# Patient Record
Sex: MALE | Race: WHITE | NOT HISPANIC OR LATINO | Employment: UNEMPLOYED | URBAN - METROPOLITAN AREA
[De-identification: names, ages, dates, MRNs, and addresses within clinical notes are randomized per-mention and may not be internally consistent; named-entity substitution may affect disease eponyms.]

---

## 2019-03-07 ENCOUNTER — HOSPITAL ENCOUNTER (OUTPATIENT)
Dept: RADIOLOGY | Facility: CLINIC | Age: 49
Discharge: HOME | End: 2019-03-07
Payer: MEDICAID

## 2019-03-07 DIAGNOSIS — R60.9 EDEMA: ICD-10-CM

## 2019-03-07 DIAGNOSIS — R60.9 EDEMA: Primary | ICD-10-CM

## 2019-03-07 PROCEDURE — 93970 EXTREMITY STUDY: CPT | Mod: 26,,, | Performed by: RADIOLOGY

## 2019-03-07 PROCEDURE — 71046 XR CHEST PA AND LATERAL: ICD-10-PCS | Mod: 26,,, | Performed by: RADIOLOGY

## 2019-03-07 PROCEDURE — 71046 X-RAY EXAM CHEST 2 VIEWS: CPT | Mod: TC,FY,PO

## 2019-03-07 PROCEDURE — 93970 US LOWER EXTREMITY VEINS BILATERAL: ICD-10-PCS | Mod: 26,,, | Performed by: RADIOLOGY

## 2019-03-07 PROCEDURE — 71046 X-RAY EXAM CHEST 2 VIEWS: CPT | Mod: 26,,, | Performed by: RADIOLOGY

## 2019-03-07 PROCEDURE — 93970 EXTREMITY STUDY: CPT | Mod: TC,PO

## 2023-02-09 ENCOUNTER — OFFICE VISIT (OUTPATIENT)
Dept: URGENT CARE | Facility: CLINIC | Age: 53
End: 2023-02-09
Payer: MEDICAID

## 2023-02-09 VITALS
TEMPERATURE: 98 F | RESPIRATION RATE: 20 BRPM | HEART RATE: 92 BPM | WEIGHT: 190 LBS | BODY MASS INDEX: 24.38 KG/M2 | DIASTOLIC BLOOD PRESSURE: 109 MMHG | HEIGHT: 74 IN | OXYGEN SATURATION: 97 % | SYSTOLIC BLOOD PRESSURE: 178 MMHG

## 2023-02-09 DIAGNOSIS — I87.2 VENOUS STASIS DERMATITIS OF BOTH LOWER EXTREMITIES: Primary | ICD-10-CM

## 2023-02-09 PROCEDURE — 1159F MED LIST DOCD IN RCRD: CPT | Mod: CPTII,S$GLB,,

## 2023-02-09 PROCEDURE — 99203 OFFICE O/P NEW LOW 30 MIN: CPT | Mod: S$GLB,,,

## 2023-02-09 PROCEDURE — 3008F PR BODY MASS INDEX (BMI) DOCUMENTED: ICD-10-PCS | Mod: CPTII,S$GLB,,

## 2023-02-09 PROCEDURE — 1159F PR MEDICATION LIST DOCUMENTED IN MEDICAL RECORD: ICD-10-PCS | Mod: CPTII,S$GLB,,

## 2023-02-09 PROCEDURE — 3080F DIAST BP >= 90 MM HG: CPT | Mod: CPTII,S$GLB,,

## 2023-02-09 PROCEDURE — 99203 PR OFFICE/OUTPT VISIT, NEW, LEVL III, 30-44 MIN: ICD-10-PCS | Mod: S$GLB,,,

## 2023-02-09 PROCEDURE — 1160F PR REVIEW ALL MEDS BY PRESCRIBER/CLIN PHARMACIST DOCUMENTED: ICD-10-PCS | Mod: CPTII,S$GLB,,

## 2023-02-09 PROCEDURE — 3077F SYST BP >= 140 MM HG: CPT | Mod: CPTII,S$GLB,,

## 2023-02-09 PROCEDURE — 3077F PR MOST RECENT SYSTOLIC BLOOD PRESSURE >= 140 MM HG: ICD-10-PCS | Mod: CPTII,S$GLB,,

## 2023-02-09 PROCEDURE — 1160F RVW MEDS BY RX/DR IN RCRD: CPT | Mod: CPTII,S$GLB,,

## 2023-02-09 PROCEDURE — 3008F BODY MASS INDEX DOCD: CPT | Mod: CPTII,S$GLB,,

## 2023-02-09 PROCEDURE — 3080F PR MOST RECENT DIASTOLIC BLOOD PRESSURE >= 90 MM HG: ICD-10-PCS | Mod: CPTII,S$GLB,,

## 2023-02-09 RX ORDER — TRIAMCINOLONE ACETONIDE 1 MG/G
CREAM TOPICAL 2 TIMES DAILY
Qty: 80 G | Refills: 1 | Status: SHIPPED | OUTPATIENT
Start: 2023-02-09

## 2023-02-09 NOTE — PROGRESS NOTES
"Subjective:       Patient ID: Pool Luo is a 52 y.o. male.    Vitals:  height is 6' 2" (1.88 m) and weight is 86.2 kg (190 lb). His temperature is 97.8 °F (36.6 °C). His blood pressure is 178/109 (abnormal) and his pulse is 92. His respiration is 20 and oxygen saturation is 97%.     Chief Complaint: Rash    Pt is a 51 y/o M who presents with an itchy rash to his BL legs x3-4 months. States he has hx of lower leg edema and been diagnosed with stasis dermatitis and eczema in the past. Had a similar occurrence in 2018 that improved with topical steroids. Has not followed up with his PCP in some time. Denies any fever, blisters, purulent drainage, CP, SoB, abdominal pain, n/v.    Rash  This is a recurrent problem. The current episode started more than 1 month ago. The problem has been gradually worsening since onset. The affected locations include the right upper leg, right lower leg, left lower leg and left upper leg. The rash is characterized by redness, swelling and burning. He was exposed to nothing. Pertinent negatives include no congestion, cough, diarrhea, fever, shortness of breath, sore throat or vomiting. Past treatments include topical steroids. The treatment provided no relief.     Constitution: Negative for chills and fever.   HENT:  Negative for ear pain, ear discharge, congestion and sore throat.    Neck: Negative for neck pain and neck stiffness.   Cardiovascular:  Positive for leg swelling. Negative for chest pain.   Respiratory:  Negative for cough and shortness of breath.    Gastrointestinal:  Negative for abdominal pain, nausea, vomiting and diarrhea.   Genitourinary:  Negative for dysuria.   Skin:  Positive for rash and erythema. Negative for wound and abscess.   Allergic/Immunologic: Positive for itching.   Neurological:  Negative for dizziness and headaches.     Objective:      Physical Exam   Constitutional: He is oriented to person, place, and time. He appears well-developed.   HENT: "   Head: Normocephalic and atraumatic. Head is without abrasion, without contusion and without laceration.   Ears:   Right Ear: External ear normal.   Left Ear: External ear normal.   Nose: Nose normal.   Mouth/Throat: Oropharynx is clear and moist and mucous membranes are normal. Mucous membranes are moist. Oropharynx is clear.   Eyes: Conjunctivae, EOM and lids are normal. Pupils are equal, round, and reactive to light.   Neck: Trachea normal and phonation normal. Neck supple.   Cardiovascular: Normal rate, regular rhythm and normal heart sounds.   Pulmonary/Chest: Effort normal and breath sounds normal. No stridor. No respiratory distress.   Musculoskeletal: Normal range of motion.         General: Swelling present. Normal range of motion.      Right lower le+ Edema present.      Left lower le+ Edema present.   Neurological: He is alert and oriented to person, place, and time.   Skin: Skin is warm, dry, intact and rash (Erythematous, scaly rash to BL LEs.). Capillary refill takes less than 2 seconds. erythema No abrasion, No burn, No bruising and No ecchymosis   Psychiatric: His speech is normal and behavior is normal. Judgment and thought content normal.   Nursing note and vitals reviewed.      Assessment:       1. Venous stasis dermatitis of both lower extremities          Plan:         Venous stasis dermatitis of both lower extremities  -     triamcinolone acetonide 0.1% (KENALOG) 0.1 % cream; Apply topically 2 (two) times daily.  Dispense: 80 g; Refill: 1         Will give triamcinolone for stasis dermatitis.  Advised patient to follow-up with his PCP.  Clinic return ED precautions given.  Patient verbalizes understanding and agrees with plan.          Patient Instructions   Rashes  If your condition worsens or fails to improve we recommend that you receive another evaluation at the ER immediately or contact your PCP to discuss your concerns or return here. You must understand that you've received an  urgent care treatment only and that you may be released before all your medical problems are known or treated. You the patient will arrange for follouwp care as instructed.   -  Avoid picking or manipulating the affected areas. Clean the areas twice a day with water and soap.  Apply the topical cream as prescribed.     -  You should seek ER treatment if fever, increase pain, swelling, red streaks from affected area or other signs of increasing infection.     -  Tylenol or ibuprofen can also be used as directed for pain unless you have an allergy to them or medical condition such as stomach ulcers, kidney or liver disease or blood thinners etc for which you should not be taking these type of medications.   If not allergic, please take over the counter Tylenol (Acetaminophen) and/or Motrin (Ibuprofen) as directed for control of pain and/or fever.

## 2023-09-27 ENCOUNTER — HOSPITAL ENCOUNTER (EMERGENCY)
Facility: HOSPITAL | Age: 53
Discharge: HOME | End: 2023-09-27
Attending: EMERGENCY MEDICINE
Payer: MEDICAID

## 2023-09-27 VITALS
HEART RATE: 94 BPM | HEIGHT: 74 IN | RESPIRATION RATE: 20 BRPM | BODY MASS INDEX: 24.38 KG/M2 | DIASTOLIC BLOOD PRESSURE: 94 MMHG | TEMPERATURE: 98 F | WEIGHT: 190 LBS | SYSTOLIC BLOOD PRESSURE: 174 MMHG | OXYGEN SATURATION: 97 %

## 2023-09-27 DIAGNOSIS — L03.115 CELLULITIS OF RIGHT LOWER EXTREMITY: Primary | ICD-10-CM

## 2023-09-27 LAB
ALBUMIN SERPL-MCNC: 3.9 G/DL (ref 3.3–5.5)
ALLENS TEST: ABNORMAL
ALP SERPL-CCNC: 76 U/L (ref 42–141)
BILIRUB SERPL-MCNC: 0.5 MG/DL (ref 0.2–1.6)
BUN SERPL-MCNC: 8 MG/DL (ref 7–22)
CALCIUM SERPL-MCNC: 9.8 MG/DL (ref 8–10.3)
CHLORIDE SERPL-SCNC: 101 MMOL/L (ref 98–108)
CREAT SERPL-MCNC: 0.9 MG/DL (ref 0.6–1.2)
GLUCOSE SERPL-MCNC: 80 MG/DL (ref 73–118)
HCO3 UR-SCNC: 32 MMOL/L
LDH SERPL L TO P-CCNC: 1.26 MMOL/L (ref 0.5–2.2)
PCO2 BLDA: 57.2 MMHG (ref 35–45)
PH SMN: 7.36 [PH] (ref 7.35–7.45)
PO2 BLDA: 16 MMHG (ref 40–60)
POC ALT (SGPT): 20 U/L (ref 10–47)
POC AST (SGOT): 23 U/L (ref 11–38)
POC BE: 5 MMOL/L
POC SATURATED O2: 19 % (ref 95–100)
POC TCO2: 28 MMOL/L (ref 18–33)
POC TCO2: 34 MMOL/L (ref 24–29)
POTASSIUM BLD-SCNC: 3.8 MMOL/L (ref 3.6–5.1)
PROTEIN, POC: 7.5 G/DL (ref 6.4–8.1)
SAMPLE: ABNORMAL
SITE: ABNORMAL
SODIUM BLD-SCNC: 138 MMOL/L (ref 128–145)

## 2023-09-27 PROCEDURE — 83605 ASSAY OF LACTIC ACID: CPT | Mod: ER

## 2023-09-27 PROCEDURE — 96361 HYDRATE IV INFUSION ADD-ON: CPT | Mod: ER

## 2023-09-27 PROCEDURE — 96374 THER/PROPH/DIAG INJ IV PUSH: CPT | Mod: ER,59

## 2023-09-27 PROCEDURE — 99285 EMERGENCY DEPT VISIT HI MDM: CPT | Mod: 25,ER

## 2023-09-27 PROCEDURE — 87040 BLOOD CULTURE FOR BACTERIA: CPT | Mod: 59

## 2023-09-27 PROCEDURE — 25000003 PHARM REV CODE 250: Mod: ER

## 2023-09-27 PROCEDURE — 63600175 PHARM REV CODE 636 W HCPCS: Mod: ER

## 2023-09-27 PROCEDURE — 80053 COMPREHEN METABOLIC PANEL: CPT | Mod: ER

## 2023-09-27 PROCEDURE — 82803 BLOOD GASES ANY COMBINATION: CPT | Mod: ER

## 2023-09-27 RX ORDER — IBUPROFEN 800 MG/1
800 TABLET ORAL EVERY 6 HOURS PRN
Qty: 20 TABLET | Refills: 0 | Status: SHIPPED | OUTPATIENT
Start: 2023-09-27

## 2023-09-27 RX ORDER — CLINDAMYCIN HYDROCHLORIDE 150 MG/1
450 CAPSULE ORAL
Status: COMPLETED | OUTPATIENT
Start: 2023-09-27 | End: 2023-09-27

## 2023-09-27 RX ORDER — MUPIROCIN 20 MG/G
OINTMENT TOPICAL 3 TIMES DAILY
Qty: 22 G | Refills: 0 | Status: SHIPPED | OUTPATIENT
Start: 2023-09-27

## 2023-09-27 RX ORDER — KETOROLAC TROMETHAMINE 30 MG/ML
15 INJECTION, SOLUTION INTRAMUSCULAR; INTRAVENOUS
Status: COMPLETED | OUTPATIENT
Start: 2023-09-27 | End: 2023-09-27

## 2023-09-27 RX ORDER — CLINDAMYCIN HYDROCHLORIDE 150 MG/1
450 CAPSULE ORAL 3 TIMES DAILY
Qty: 63 CAPSULE | Refills: 0 | Status: SHIPPED | OUTPATIENT
Start: 2023-09-27 | End: 2023-10-04

## 2023-09-27 RX ORDER — ACETAMINOPHEN 500 MG
500 TABLET ORAL EVERY 6 HOURS PRN
Qty: 28 TABLET | Refills: 0 | Status: SHIPPED | OUTPATIENT
Start: 2023-09-27 | End: 2023-10-04

## 2023-09-27 RX ADMIN — CLINDAMYCIN HYDROCHLORIDE 450 MG: 150 CAPSULE ORAL at 05:09

## 2023-09-27 RX ADMIN — KETOROLAC TROMETHAMINE 15 MG: 30 INJECTION, SOLUTION INTRAMUSCULAR; INTRAVENOUS at 05:09

## 2023-09-27 RX ADMIN — SODIUM CHLORIDE 1000 ML: 9 INJECTION, SOLUTION INTRAVENOUS at 05:09

## 2023-09-27 NOTE — DISCHARGE INSTRUCTIONS

## 2023-09-28 NOTE — ED PROVIDER NOTES
"Encounter Date: 9/27/2023       History     Chief Complaint   Patient presents with    Insect Bite     Pt reports "unknown" insect bite since Thursday to the right knee. Open wound noted. Redness and swelling noted. Drainage noted. Pt took otc medications with no relief. Denies trauma/injury.     Pool Luo is a 53-year-old male with no pertinent past medical history who presents to the emergency department with a chief complaint of insect bite.  He was not sure that he was ever bitten by an insect.  He has a draining lesion on his right knee.  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  Since then, it has been growing and becoming more painful.  Yesterday, began draining.  States that it is painful to bend his knee.  Denies any fever.  Endorses associated surrounding redness that is traveling up his leg.    The history is provided by the patient. No  was used.     Review of patient's allergies indicates:  No Known Allergies  No past medical history on file.  No past surgical history on file.  No family history on file.  Social History     Tobacco Use    Smoking status: Every Day     Types: Cigarettes    Smokeless tobacco: Never   Substance Use Topics    Alcohol use: Not Currently    Drug use: Yes     Types: Marijuana     Review of Systems   Constitutional:  Negative for fever.   HENT:  Negative for sore throat.    Respiratory:  Negative for shortness of breath.    Cardiovascular:  Negative for chest pain.   Gastrointestinal:  Negative for nausea.   Genitourinary:  Negative for dysuria.   Musculoskeletal:  Negative for back pain.   Skin:  Positive for color change and wound. Negative for rash.   Neurological:  Negative for weakness.   Hematological:  Does not bruise/bleed easily.       Physical Exam     Initial Vitals [09/27/23 1458]   BP Pulse Resp Temp SpO2   (!) 166/90 105 18 98.7 °F (37.1 °C) 98 %    "   MAP       --         Physical Exam    Nursing note and vitals reviewed.  Constitutional: Vital signs are normal. He appears well-developed and well-nourished. He is active and cooperative. He does not appear ill. No distress.   Clinically well-appearing, no acute distress.  He was hypertensive.   HENT:   Head: Normocephalic and atraumatic.   Right Ear: Hearing and external ear normal.   Left Ear: Hearing and external ear normal.   Nose: Nose normal.   Mouth/Throat: Oropharynx is clear and moist and mucous membranes are normal. No posterior oropharyngeal edema or posterior oropharyngeal erythema.   Eyes: Conjunctivae and EOM are normal.   Neck: Phonation normal. Neck supple. No stridor present.   Normal range of motion.   Full passive range of motion without pain.     Cardiovascular:  Normal rate, regular rhythm, S1 normal, S2 normal and normal heart sounds.  No extrasystoles are present.          No murmur heard.  No tachycardia.  No murmurs or rubs.   Pulmonary/Chest: Effort normal and breath sounds normal. No accessory muscle usage. No tachypnea. No respiratory distress. He exhibits no tenderness.   Abdominal: Abdomen is soft and flat. Bowel sounds are normal. He exhibits no distension. There is no abdominal tenderness.   Musculoskeletal:      Cervical back: Full passive range of motion without pain, normal range of motion and neck supple. No spinous process tenderness. Normal range of motion.      Right knee: Swelling and erythema present. Normal range of motion. Tenderness present.        Legs:       Comments: Approximately 3 cm area of erythema tenderness to palpation.  Warm to the touch.  Central punctum with purulent drainage.  Erythema spreading up the leg.  Patient has full range of motion of the right knee with encouragement.     Neurological: He is alert and oriented to person, place, and time. GCS eye subscore is 4. GCS verbal subscore is 5. GCS motor subscore is 6.   Skin: Skin is warm and dry.  Capillary refill takes less than 2 seconds. No rash noted. No pallor.         ED Course   Procedures  Labs Reviewed   ISTAT PROCEDURE - Abnormal; Notable for the following components:       Result Value    POC PCO2 57.2 (*)     POC PO2 16 (*)     POC SATURATED O2 19 (*)     POC TCO2 34 (*)     All other components within normal limits   CULTURE, AEROBIC  (SPECIFY SOURCE)   CULTURE, BLOOD   CULTURE, BLOOD   POCT CBC   POCT CMP   POCT CMP          Imaging Results              US Soft Tissue Fluid Collection with Imaging (Final result)  Result time 09/27/23 18:33:18      Final result by Valencia Wilson MD (09/27/23 18:33:18)                   Impression:      As above described.      Electronically signed by: Valencia Wilson  Date:    09/27/2023  Time:    18:33               Narrative:    EXAMINATION:  ULTRASOUND SOFT TISSUE FLUID COLLECTION PERCUTANEOUS WITH IMAGING    CLINICAL HISTORY:  Right knee cellulitis / abscess;    TECHNIQUE:  Real-time ultrasound the right knee was performed.    COMPARISON:  None.    FINDINGS:  No evidence of focal fluid collection in the area of the right knee.  Edematous changes are present.  Question cellulitis.                                       Medications   sodium chloride 0.9% bolus 1,000 mL 1,000 mL (0 mLs Intravenous Stopped 9/27/23 1836)   ketorolac injection 15 mg (15 mg Intravenous Given 9/27/23 1737)   clindamycin capsule 450 mg (450 mg Oral Given 9/27/23 1737)     Medical Decision Making  53-year-old male presenting to the emergency department with a chief complaint of wound on his knee.  Unsure exactly how wound started.  Has been growing, started draining yesterday, surrounding erythema and warmth.  On physical exam, patient is clinically well-appearing and in no acute distress.  Afebrile, no tachycardia.  Wound to the right knee with purulent drainage and surrounding erythema that is spreading up the leg.    Differential diagnosis includes but is not limited to wound  with or without muscle, tendon, ligament, bone, or neurovascular damage, foreign body, or surrounding soft tissue infection such as cellulitis or erysipelas.    CBC with mild leukocytosis at 13.7 K. CMP within normal limits.  Lactate 1.26.  Blood cultures pending.  Ultrasound with no fluid collection, findings consistent with cellulitis.  Presentation is most consistent with cellulitis.  First dose of clindamycin, IV fluids, and Toradol given in the emergency department.  Patient states this relieved his pain completely.  With normal vital signs, clinically well-appearing patient, I will trial antibiotics as an outpatient.  Clindamycin, Motrin, Tylenol, mupirocin electronically prescribed and sent to the patient's preferred pharmacy.  Patient instructed to returned to [facility] if his symptoms worsen because he may need IV antibiotics.  Patient voiced his understanding.    Return precautions were discussed, all patient questions were answered, and the patient was agreeable to the plan of care.  He was discharged home in stable condition and will follow up with his primary care provider or return to the emergency department if his symptoms worsen or do not improve.     Amount and/or Complexity of Data Reviewed  Labs: ordered. Decision-making details documented in ED Course.  Radiology: ordered. Decision-making details documented in ED Course.    Risk  OTC drugs.  Prescription drug management.  Diagnosis or treatment significantly limited by social determinants of health.                               Clinical Impression:   Final diagnoses:  [L03.115] Cellulitis of right lower extremity (Primary)        ED Disposition Condition    Discharge Stable          ED Prescriptions       Medication Sig Dispense Start Date End Date Auth. Provider    ibuprofen (ADVIL,MOTRIN) 800 MG tablet Take 1 tablet (800 mg total) by mouth every 6 (six) hours as needed for Pain. 20 tablet 9/27/2023 -- Tony Fuentes, EYAD     acetaminophen (TYLENOL) 500 MG tablet Take 1 tablet (500 mg total) by mouth every 6 (six) hours as needed. 28 tablet 9/27/2023 10/4/2023 Tony Fuentes, EYAD    clindamycin (CLEOCIN) 150 MG capsule Take 3 capsules (450 mg total) by mouth 3 (three) times daily. for 7 days 63 capsule 9/27/2023 10/4/2023 Tony Fuentes, EYAD    mupirocin (BACTROBAN) 2 % ointment Apply topically 3 (three) times daily. 22 g 9/27/2023 -- Tony Fuentes, EYAD          Follow-up Information       Follow up With Specialties Details Why Contact Info    Ronnie Whitmore MD Internal Medicine Schedule an appointment as soon as possible for a visit  As needed, If symptoms worsen [address] [address]  [phone]      [facility] - Emergency Dept Emergency Medicine Go to  If symptoms worsen [address] [facility] [address] [phone]             Tony Fuentes, EYAD  09/27/23 2036

## 2023-10-01 LAB
BACTERIA BLD CULT: NORMAL
BACTERIA BLD CULT: NORMAL